# Patient Record
Sex: FEMALE | Race: BLACK OR AFRICAN AMERICAN | Employment: UNEMPLOYED | URBAN - METROPOLITAN AREA
[De-identification: names, ages, dates, MRNs, and addresses within clinical notes are randomized per-mention and may not be internally consistent; named-entity substitution may affect disease eponyms.]

---
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
## 2023-09-20 ENCOUNTER — HOSPITAL ENCOUNTER (EMERGENCY)
Age: 18
Discharge: HOME | End: 2023-09-20
Payer: COMMERCIAL

## 2023-09-20 VITALS
DIASTOLIC BLOOD PRESSURE: 83 MMHG | SYSTOLIC BLOOD PRESSURE: 132 MMHG | RESPIRATION RATE: 18 BRPM | HEART RATE: 90 BPM | OXYGEN SATURATION: 99 % | TEMPERATURE: 99.2 F

## 2023-09-20 DIAGNOSIS — N64.4 PAIN OF BOTH BREASTS: Primary | ICD-10-CM

## 2023-09-20 PROCEDURE — 99283 EMERGENCY DEPT VISIT LOW MDM: CPT

## 2023-09-20 ASSESSMENT — ENCOUNTER SYMPTOMS
CHEST TIGHTNESS: 0
NAUSEA: 0
SHORTNESS OF BREATH: 0
VOMITING: 0
DIARRHEA: 0
ABDOMINAL PAIN: 0

## 2023-09-21 NOTE — ED PROVIDER NOTES
[facility]        Pt Name: Nate Malone  MRN: [id number]  [facility] [place] 2005  Date of evaluation: 9/20/2023  Provider: KLAUDIA Fenton CNP  PCP: [facility]  Note Started: 11:43 PM EDT 9/20/23      ALEXANDER. I have evaluated this patient. CHIEF COMPLAINT       Chief Complaint   Patient presents with    Breast Pain     Pt arrives in the ED with c/o breast pain. Pt stated it hurts on both but more on the left. Pt stated this started two days ago        HISTORY OF PRESENT ILLNESS: 1 or more Elements     History from : Patient    Limitations to history : None    Nate Malone is a 19 y.o. female who presents to the emergency department with complaint of bilateral breast pain. Denies injury or trauma. Describes aching/throbbing pain. No redness, warmth, or lumps noted. The patient was at urgent care 2 days ago and had a pregnancy test as well as STD testing, states all were negative. She is not due for her period until 1 October, the period is not late. She is not trying to get pregnant nor does she think that she may be pregnant. She does not follow with gynecology does not have primary care. No nipple discharge. Denies any headache, fever, lightheadedness, dizziness, visual disturbances. No chest pain or pressure. No neck or back pain. No shortness of breath, cough, or congestion. No abdominal pain, nausea, vomiting, diarrhea, constipation, or dysuria. No rash. Nursing Notes were all reviewed and agreed with or any disagreements were addressed in the HPI. REVIEW OF SYSTEMS :      Review of Systems   Constitutional:  Negative for activity change, chills and fever. Respiratory:  Negative for chest tightness and shortness of breath. Cardiovascular:  Negative for chest pain. Gastrointestinal:  Negative for abdominal pain, diarrhea, nausea and vomiting.    Genitourinary: